# Patient Record
Sex: FEMALE | Race: OTHER | URBAN - METROPOLITAN AREA
[De-identification: names, ages, dates, MRNs, and addresses within clinical notes are randomized per-mention and may not be internally consistent; named-entity substitution may affect disease eponyms.]

---

## 2017-01-26 ENCOUNTER — TELEPHONE (OUTPATIENT)
Dept: OPHTHALMOLOGY | Facility: CLINIC | Age: 42
End: 2017-01-26

## 2017-01-30 NOTE — TELEPHONE ENCOUNTER
Patient was seen by Dr. 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 She had an appointment scheduled on 1/14/17, but she cancelled it and has not come back.   Today, I left a message for patient asking how she is doing and if she needs to b

## (undated) NOTE — Clinical Note
1/30/2017              [address] [facility]        [address] [facility]         Dear Cody,    It has come to my attention that you cancelled your last appointment with me.    Please contact the office at your earliest convenienc